# Patient Record
Sex: FEMALE | Race: WHITE | URBAN - METROPOLITAN AREA
[De-identification: names, ages, dates, MRNs, and addresses within clinical notes are randomized per-mention and may not be internally consistent; named-entity substitution may affect disease eponyms.]

---
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
## 2024-12-06 NOTE — TELEPHONE ENCOUNTER
Patient has a sports px scheduled, mom would like to know if patient needs any immunizations Pt to ED with complaint of vomiting and diarrhea. St it may be food poisoning. St onset of three hours ago. Denies any urinary sx, denies CP/SOB. Arrives a/o x4.

## (undated) NOTE — LETTER
Date: 2/26/2019    Patient Name: Chun Martinesr          To Whom it may concern: This letter has been written at the patient's parent's request. The above patient was seen at the [facility] for treatment of a medical condition.         The

## (undated) NOTE — LETTER
VACCINE ADMINISTRATION RECORD  PARENT / GUARDIAN APPROVAL  Date: 2022  Vaccine administered to: Layne Austin     : 11/3/2005    MRN: [id number]    A copy of the appropriate Centers for Disease Control and Prevention Vaccine Information statement has been provided. I have read or have had explained the information about the diseases and the vaccines listed below. There was an opportunity to ask questions and any questions were answered satisfactorily. I believe that I understand the benefits and risks of the vaccine cited and ask that the vaccine(s) listed below be given to me or to the person named above (for whom I am authorized to make this request). VACCINES ADMINISTERED:  Menveo    I have read and hereby agree to be bound by the terms of this agreement as stated above. My signature is valid until revoked by me in writing. This document is signed by, relationship:on 2022.:                                                                                                                                         Parent / Cathy Ruth Signature                                                Date    Donovan Lopez MA served as a witness to authentication that the identity of the person signing electronically is in fact the person represented as signing. This document was generated by Donovan Lopez MA on 2022.

## (undated) NOTE — LETTER
Date: 8/29/2017    Patient Name: Linda           To Whom it may concern: This letter has been written at the patient's request. The above patient was seen at the [facility] for treatment of a medical condition.     This patient shou

## (undated) NOTE — MR AVS SNAPSHOT
[address]  [address] [id number] [phone]               Thank you for choosing us for your health care visit with Vaibhav Flores MD.  We are glad to serve you and happy to provide you with this summary o Call [phone] for help. [employer] is NOT to be used for urgent needs. For medical emergencies, dial 911.                Visit [facility] online at  [employer]

## (undated) NOTE — LETTER
Date: 2/26/2019    Patient Name: Doug Elder          To Whom it may concern: This letter has been written at the patient's request. The above patient was seen at the [facility] for treatment of a medical condition.     This patient shou